# Patient Record
Sex: FEMALE | Race: WHITE | NOT HISPANIC OR LATINO | Employment: FULL TIME | URBAN - METROPOLITAN AREA
[De-identification: names, ages, dates, MRNs, and addresses within clinical notes are randomized per-mention and may not be internally consistent; named-entity substitution may affect disease eponyms.]

---

## 2019-07-29 ENCOUNTER — TRANSFERRED RECORDS (OUTPATIENT)
Dept: FAMILY MEDICINE | Facility: CLINIC | Age: 24
End: 2019-07-29

## 2019-07-29 LAB
CHLAMYDIA - HIM PATIENT REPORTED: NEGATIVE
HPV ABSTRACT: NORMAL
N GONORRHOEA DNA SPEC QL PROBE+SIG AMP: NEGATIVE
PAP-ABSTRACT: NORMAL
PAP-ABSTRACT: NORMAL
SPECIMEN DESCRIP: NORMAL

## 2022-03-10 NOTE — PROGRESS NOTES
"Female Preventive Health Visit    SUBJECTIVE:    This 27 year old female presents for a routine preventive physical exam.    The patient has the following concerns:   1. Hx of migraines: usually only with visual aura and no headache. Started around 2016. Trigger : stress and sleep depravation. Uses caffeine. No recent change.     Patient's medications, allergies, past medical, surgical and family histories were reviewed and updated as appropriate.    OB/Gyn History:    Last Pap Smear:  July 2019. Up to date per patient report. Await outside records. RAF signed today.   Current Contraceptive Method:  Progesterone only pill due to migraine.  Is not currently sexually active. Has lightened periods with POP which she likes. Not using for contraception at present.     Health Maintenance:  Health maintenance alerts were reviewed and updated as appropriate.    Healthy Habits:    Do you get at least three servings of calcium containing foods daily (dairy, green leafy vegetables, etc.)? yes    Amount of exercise or daily activities, outside of work: 3 day(s) per week    Problems taking medications regularly No    Medication side effects: No    Have you had an eye exam in the past two years? yes    Do you see a dentist twice per year? no, last time 2019, planning to go this year!    Do you have sleep apnea, excessive snoring or daytime drowsiness?no      OBJECTIVE:  Vitals:    03/11/22 0807   BP: 111/64   Pulse: 85   SpO2: 100%   Weight: 54.3 kg (119 lb 9.6 oz)   Height: 1.562 m (5' 1.5\")    Body mass index is 22.23 kg/m .  General: no acute distress, cooperative with exam.  HEENT:  PERRLA. Bilateral TM's, external canals, oropharynx normal.    Neck:  Supple, no lymphadenopathy or thyromegaly   Lungs: clear to auscultation bilaterally, normal respiratory effort.  Heart:  RRR without murmurs, rubs or gallops.  Normal S1 and S2.  Abdomen: normal bowel sounds, nontender, no palpable organomegaly.  Skin:  No lesions.  No " rashes.  Extremities: warm, perfused, no swelling or edema.  Neuro:  CN II-XII intact, motor & sensory function all intact.    Psych: mental status normal, mood and affect appropriate.    ASSESSMENT / PLAN:  This 27 year old female presents for a routine preventive physical exam. Preventive health topics discussed including adequate exercise and healthy diet. Return to clinic in one year for preventative exam or sooner with any other concerns.  Other issues discussed as noted below.      Routine general medical examination at a health care facility    Screening cholesterol level  -     Lipid Panel ([facility])  -     VENOUS COLLECTION    Screening for diabetes mellitus  -     Hemoglobin A1C ([facility])  -     VENOUS COLLECTION    Encounter for screening for HIV  -     HIV 1/0/2 Rflx ([facility])  -     VENOUS COLLECTION    Need for hepatitis C screening test  -     HCV Antibody ([facility])  -     VENOUS COLLECTION    Migraine with aura and without status migrainosus, not intractable  Stable at present. Encouraged to return to clinic if frequency / severity etc changes.     Needs flu shot  -     CA FLU VAC PRESRV FREE QUAD SPLIT VIR IM  MONTHS IM  -     VACCINE ADMINISTRATION, INITIAL    Encounter for surveillance of contraceptive pills  Taking POP for lightening of menses only. Not using as contraception. Discussed switching to more efficacious form of progesterone only contraception prior to becoming sexually active. LARCS discussed in more detail.

## 2022-03-11 ENCOUNTER — OFFICE VISIT (OUTPATIENT)
Dept: FAMILY MEDICINE | Facility: CLINIC | Age: 27
End: 2022-03-11

## 2022-03-11 ENCOUNTER — TRANSFERRED RECORDS (OUTPATIENT)
Dept: FAMILY MEDICINE | Facility: CLINIC | Age: 27
End: 2022-03-11

## 2022-03-11 VITALS
DIASTOLIC BLOOD PRESSURE: 64 MMHG | HEART RATE: 85 BPM | WEIGHT: 119.6 LBS | SYSTOLIC BLOOD PRESSURE: 111 MMHG | HEIGHT: 62 IN | BODY MASS INDEX: 22.01 KG/M2 | OXYGEN SATURATION: 100 %

## 2022-03-11 DIAGNOSIS — Z30.41 ENCOUNTER FOR SURVEILLANCE OF CONTRACEPTIVE PILLS: ICD-10-CM

## 2022-03-11 DIAGNOSIS — Z11.59 NEED FOR HEPATITIS C SCREENING TEST: ICD-10-CM

## 2022-03-11 DIAGNOSIS — Z00.00 ROUTINE GENERAL MEDICAL EXAMINATION AT A HEALTH CARE FACILITY: Primary | ICD-10-CM

## 2022-03-11 DIAGNOSIS — Z23 NEEDS FLU SHOT: ICD-10-CM

## 2022-03-11 DIAGNOSIS — Z11.4 ENCOUNTER FOR SCREENING FOR HIV: ICD-10-CM

## 2022-03-11 DIAGNOSIS — Z13.1 SCREENING FOR DIABETES MELLITUS: ICD-10-CM

## 2022-03-11 DIAGNOSIS — Z13.220 SCREENING CHOLESTEROL LEVEL: ICD-10-CM

## 2022-03-11 DIAGNOSIS — G43.109 MIGRAINE WITH AURA AND WITHOUT STATUS MIGRAINOSUS, NOT INTRACTABLE: ICD-10-CM

## 2022-03-11 PROCEDURE — 90471 IMMUNIZATION ADMIN: CPT | Mod: 59 | Performed by: FAMILY MEDICINE

## 2022-03-11 PROCEDURE — 99385 PREV VISIT NEW AGE 18-39: CPT | Mod: 25 | Performed by: FAMILY MEDICINE

## 2022-03-11 PROCEDURE — 36415 COLL VENOUS BLD VENIPUNCTURE: CPT | Performed by: FAMILY MEDICINE

## 2022-03-11 PROCEDURE — 90686 IIV4 VACC NO PRSV 0.5 ML IM: CPT | Performed by: FAMILY MEDICINE

## 2022-03-11 RX ORDER — ACETAMINOPHEN AND CODEINE PHOSPHATE 120; 12 MG/5ML; MG/5ML
0.35 SOLUTION ORAL DAILY
COMMUNITY
End: 2022-06-24

## 2022-03-11 NOTE — LETTER
[facility] [address] [place]  [address]  Phone: [phone]    March 15, 2022      Parisa Chang  [address]  [facility] [address]              Dear Parisa,    It was suze to see you at your recent appointment.     Here are your lab results.     Everything is within expected ranges. All good news here.     Please let us know if you have any questions.     Sincerely,     Dr. Josselin Rivera /dmo                                        Parisa Chang  Results for orders placed or performed in visit on 03/11/22   Lipid Panel ([facility])     Status: None   Result Value Ref Range    Cholesterol 153 100 - 199 mg/dL    Triglycerides 36 0 - 149 mg/dL    HDL Cholesterol 59 >39 mg/dL    VLDL Cholesterol Trevor 9 5 - 40 mg/dL    LDL Cholesterol Calculated 85 0 - 99 mg/dL    LDL/HDL Ratio 1.4 0.0 - 3.2 ratio    Narrative    Performed at:  01 - [facility] [address], [place], [place]  [id number]  : David Romero MD, Phone:  [phone]   HIV 1/0/2 Rflx ([facility])     Status: None   Result Value Ref Range    HIV Screen 4th Gen with Rflx Non Reactive Non Reactive    Narrative    Performed at:  01 - [facility] [address], [place], [place]  [id number]  : David Romero MD, Phone:  [phone]   Hemoglobin A1C ([facility])     Status: None   Result Value Ref Range    Hemoglobin A1C POCT 5.1 4.8 - 5.6 %    Narrative    Performed at:  01 - [facility] [address], [place], [place]  [id number]  : David Romero MD, Phone:  [phone]   HCV Antibody ([facility])     Status: None   Result Value Ref Range    Hep C Virus Ab <0.1 0.0 - 0.9 s/co ratio    Narrative    Performed at:  01 - [facility] [address], [place], [place]  [id number]  : David Romero MD, Phone:  [phone]

## 2022-03-12 LAB — HBA1C MFR BLD: 5.1 % (ref 4.8–5.6)

## 2022-03-15 LAB
CHOLEST SERPL-MCNC: 153 MG/DL (ref 100–199)
HCV AB SERPL QL IA: <0.1 S/CO RATIO (ref 0–0.9)
HDLC SERPL-MCNC: 59 MG/DL
HIV SCREEN 4TH GEN WITH RFLX: NON REACTIVE
LDL/HDL RATIO: 1.4 RATIO (ref 0–3.2)
LDLC SERPL CALC-MCNC: 85 MG/DL (ref 0–99)
TRIGL SERPL-MCNC: 36 MG/DL (ref 0–149)
VLDLC SERPL CALC-MCNC: 9 MG/DL (ref 5–40)

## 2022-06-24 DIAGNOSIS — Z30.41 ENCOUNTER FOR SURVEILLANCE OF CONTRACEPTIVE PILLS: Primary | ICD-10-CM

## 2022-06-24 RX ORDER — ACETAMINOPHEN AND CODEINE PHOSPHATE 120; 12 MG/5ML; MG/5ML
0.35 SOLUTION ORAL DAILY
Qty: 84 TABLET | Refills: 1 | Status: SHIPPED | OUTPATIENT
Start: 2022-06-24 | End: 2022-12-07

## 2022-12-07 DIAGNOSIS — Z30.41 ENCOUNTER FOR SURVEILLANCE OF CONTRACEPTIVE PILLS: ICD-10-CM

## 2022-12-07 RX ORDER — ACETAMINOPHEN AND CODEINE PHOSPHATE 120; 12 MG/5ML; MG/5ML
0.35 SOLUTION ORAL DAILY
Qty: 84 TABLET | Refills: 1 | Status: SHIPPED | OUTPATIENT
Start: 2022-12-07 | End: 2023-05-25

## 2023-05-25 DIAGNOSIS — Z30.41 ENCOUNTER FOR SURVEILLANCE OF CONTRACEPTIVE PILLS: ICD-10-CM

## 2023-05-25 RX ORDER — ACETAMINOPHEN AND CODEINE PHOSPHATE 120; 12 MG/5ML; MG/5ML
0.35 SOLUTION ORAL DAILY
Qty: 84 TABLET | Refills: 0 | Status: SHIPPED | OUTPATIENT
Start: 2023-05-25 | End: 2023-06-14

## 2023-06-13 NOTE — PROGRESS NOTES
"Female Preventive Health Visit    SUBJECTIVE:    This 28 year old female presents for a routine preventive physical exam.    The patient has the following concerns:     1. None     Patient's medications, allergies, past medical, surgical and family histories were reviewed and updated as appropriate.    OB/Gyn History:    Last Pap Smear: due    Health Maintenance:  Health maintenance alerts were reviewed and updated as appropriate.      Healthy Habits:    Do you get at least three servings of calcium containing foods daily (dairy, green leafy vegetables, etc.)? yes    Amount of exercise or daily activities, outside of work: 4 day(s) per week    Problems taking medications regularly No    Medication side effects: No    Have you had an eye exam in the past two years? yes    Do you see a dentist twice per year? No - once per year    Do you have sleep apnea, excessive snoring or daytime drowsiness?no          OBJECTIVE:  Vitals:    06/14/23 0912   BP: 129/66   Pulse: 66   SpO2: 100%   Weight: 59.1 kg (130 lb 6.4 oz)   Height: 1.556 m (5' 1.25\")    Body mass index is 24.44 kg/m .  General: no acute distress, cooperative with exam.  HEENT:  PERRLA. Bilateral TM's, external canals, oropharynx normal.    Neck:  Supple, no lymphadenopathy or thyromegaly   Lungs: clear to auscultation bilaterally, normal respiratory effort.  Heart:  RRR without murmurs, rubs or gallops.  Normal S1 and S2.  Abdomen: normal bowel sounds, nontender, no palpable organomegaly.  Genitourinary: normal external genitalia, vulva, vagina.   Skin:  No lesions.  No rashes.  Extremities: warm, perfused, no swelling or edema.  Neuro:  CN II-XII intact, motor & sensory function all intact.    Psych: mental status normal, mood and affect appropriate.    ASSESSMENT / PLAN:  This 28 year old female presents for a routine preventive physical exam. Preventive health topics discussed including adequate exercise and healthy diet. Return to clinic in one year for " preventative exam or sooner with any other concerns.  Other issues discussed as noted below.      Routine general medical examination at a health care facility    Encounter for surveillance of contraceptive pills  -     norethindrone (NORLYDA) 0.35 MG tablet; Take 1 tablet (0.35 mg) by mouth daily    Need for Tdap vaccination  -     TDAP 10-64Y (ADACEL,BOOSTRIX)  -     VACCINE ADMINISTRATION, INITIAL    Cervical cancer screening  -     Pap IG HPV rfx HPV 16 and 18,45 ([facility])    Family history of diabetes mellitus  -     Hemoglobin A1C ([facility])  -     VENOUS COLLECTION    Screening for cardiovascular condition  -     Lipid Profile ([facility])  -     VENOUS COLLECTION

## 2023-06-14 ENCOUNTER — OFFICE VISIT (OUTPATIENT)
Dept: FAMILY MEDICINE | Facility: CLINIC | Age: 28
End: 2023-06-14

## 2023-06-14 VITALS
WEIGHT: 130.4 LBS | BODY MASS INDEX: 24.62 KG/M2 | DIASTOLIC BLOOD PRESSURE: 66 MMHG | OXYGEN SATURATION: 100 % | HEART RATE: 66 BPM | SYSTOLIC BLOOD PRESSURE: 129 MMHG | HEIGHT: 61 IN

## 2023-06-14 DIAGNOSIS — Z12.4 CERVICAL CANCER SCREENING: ICD-10-CM

## 2023-06-14 DIAGNOSIS — Z13.6 SCREENING FOR CARDIOVASCULAR CONDITION: ICD-10-CM

## 2023-06-14 DIAGNOSIS — Z00.00 ROUTINE GENERAL MEDICAL EXAMINATION AT A HEALTH CARE FACILITY: Primary | ICD-10-CM

## 2023-06-14 DIAGNOSIS — Z23 NEED FOR TDAP VACCINATION: ICD-10-CM

## 2023-06-14 DIAGNOSIS — Z83.3 FAMILY HISTORY OF DIABETES MELLITUS: ICD-10-CM

## 2023-06-14 DIAGNOSIS — Z30.41 ENCOUNTER FOR SURVEILLANCE OF CONTRACEPTIVE PILLS: ICD-10-CM

## 2023-06-14 PROCEDURE — 90715 TDAP VACCINE 7 YRS/> IM: CPT | Performed by: FAMILY MEDICINE

## 2023-06-14 PROCEDURE — 36415 COLL VENOUS BLD VENIPUNCTURE: CPT | Performed by: FAMILY MEDICINE

## 2023-06-14 PROCEDURE — 90471 IMMUNIZATION ADMIN: CPT | Mod: 59 | Performed by: FAMILY MEDICINE

## 2023-06-14 PROCEDURE — 99395 PREV VISIT EST AGE 18-39: CPT | Mod: 25 | Performed by: FAMILY MEDICINE

## 2023-06-14 RX ORDER — ACETAMINOPHEN AND CODEINE PHOSPHATE 120; 12 MG/5ML; MG/5ML
0.35 SOLUTION ORAL DAILY
Qty: 84 TABLET | Refills: 3 | Status: SHIPPED | OUTPATIENT
Start: 2023-06-14 | End: 2023-08-17

## 2023-06-14 NOTE — LETTER
June 19, 2023      Parisa Chang  [address]  [facility] [address]        Dear Parisa,     It was suze to see you at your recent appointment.     You PAP smear is negative for abnormal cells and HPV. Good news.     Your next PAP smear will be in 3 years.     Please let us know if you have any questions.     Sincerely,     Dr. Josselin Rivera     Resulted Orders   Pap IG HPV rfx HPV 16 and 18,45 ([facility])   Result Value Ref Range    DIAGNOSIS: Comment       Comment:      NEGATIVE FOR INTRAEPITHELIAL LESION OR MALIGNANCY.  THIS SPECIMEN WAS RESCREENED AS PART OF OUR  PROGRAM.      Specimen adequacy: Comment       Comment:      Satisfactory for evaluation. Endocervical and/or squamous metaplastic  cells (endocervical component) are present.      Clinician Provided ICD10 Comment       Comment:      Z12.4    Performed by: Comment       Comment:      Mayelin Engel, Cytotechnologist (ASCP)    QC reviewed by: Comment       Comment:      Fazal Hyman, Cytotechnologist (ASCP)    . .     Note: Comment       Comment:      The Pap smear is a screening test designed to aid in the detection of  premalignant and malignant conditions of the uterine cervix.  It is not a  diagnostic procedure and should not be used as the sole means of detecting  cervical cancer.  Both false-positive and false-negative reports do occur.      Test Methodology: Comment       Comment:      This liquid based ThinPrep(R) pap test was screened with the  use of an image guided system.      HPV Aptima Negative Negative      Comment:      This nucleic acid amplification test detects fourteen high-risk  HPV types (16,18,31,33,35,39,45,51,52,56,58,59,66,68) without  differentiation.      HPV GENOTYPE REFLEX Comment       Comment:      Criteria not met, HPV Genotype not performed.    Narrative    Performed at:  01 - [facility]  [address], [place], [place]  [id number]  : Janis Pandya  MD, Phone:  [phone]  Performed at:  02 - [facility] [address]  [id number]  : Jun Avendano MD, Phone:  [phone]  Performed at:  03 - [facility] [address]  [id number]  : Jnu Avendano MD, Phone:  [phone]    Specimen Comment: [id number]  Specimen Comment: LMP / Prev Treat...[id number]  Specimen Comment: No. of containers..01 ThinPrep Vial   Hemoglobin A1C ([facility])   Result Value Ref Range    Hemoglobin A1C 5.1 4.8 - 5.6 %      Comment:               Prediabetes: 5.7 - 6.4           Diabetes: >6.4           Glycemic control for adults with diabetes: <7.0      Narrative    Performed at:  01 - [facility] [address], [place], [place]  [id number]  : David Romero MD, Phone:  [phone]   Lipid Panel ([facility])   Result Value Ref Range    Cholesterol 156 100 - 199 mg/dL    Triglycerides 57 0 - 149 mg/dL    HDL Cholesterol 48 >39 mg/dL    VLDL Cholesterol Trevor 12 5 - 40 mg/dL    LDL Cholesterol Calculated 96 0 - 99 mg/dL    LDL/HDL Ratio 2.0 0.0 - 3.2 ratio      Comment:                                          LDL/HDL Ratio                                              Men  Women                                1/2 Avg.Risk  1.0    1.5                                    Avg.Risk  3.6    3.2                                 2X Avg.Risk  6.2    5.0                                 3X Avg.Risk  8.0    6.1      Narrative    Performed at:  01 - [facility] [address], [place], [place]  [id number]  : David Romero MD, Phone:  [phone]

## 2023-06-14 NOTE — LETTER
Pearl 15, 2023      Parisa Chang  [address]  [address]  [facility] [address]      Dear Parisa,     It was suze to see you at your recent appointment.     Here are your lab results.     Your cholesterol values look wonderful. Everything is within normal limits.     Your A1c (or the measure of your glucose control over the last 3 months) is in the normal range. You do not have diabetes or prediabetes.     Please let us know if you have any questions.     Sincerely,     Dr. Josselin Rivera       Resulted Orders   Hemoglobin A1C ([facility])   Result Value Ref Range    Hemoglobin A1C 5.1 4.8 - 5.6 %      Comment:               Prediabetes: 5.7 - 6.4           Diabetes: >6.4           Glycemic control for adults with diabetes: <7.0      Narrative    Performed at:  01 - [facility]  [address] [place], [place]  [id number]  : David Romero MD, Phone:  [phone]   Lipid Panel ([facility])   Result Value Ref Range    Cholesterol 156 100 - 199 mg/dL    Triglycerides 57 0 - 149 mg/dL    HDL Cholesterol 48 >39 mg/dL    VLDL Cholesterol Trevor 12 5 - 40 mg/dL    LDL Cholesterol Calculated 96 0 - 99 mg/dL    LDL/HDL Ratio 2.0 0.0 - 3.2 ratio      Comment:                                          LDL/HDL Ratio                                              Men  Women                                1/2 Avg.Risk  1.0    1.5                                    Avg.Risk  3.6    3.2                                 2X Avg.Risk  6.2    5.0                                 3X Avg.Risk  8.0    6.1      Narrative    Performed at:  01 - [facility]  [address] [place], [place]  [id number]  : David Romero MD, Phone:  [phone]

## 2023-06-15 LAB
CHOLEST SERPL-MCNC: 156 MG/DL (ref 100–199)
HBA1C MFR BLD: 5.1 % (ref 4.8–5.6)
HDLC SERPL-MCNC: 48 MG/DL
LDL/HDL RATIO: 2 RATIO (ref 0–3.2)
LDLC SERPL CALC-MCNC: 96 MG/DL (ref 0–99)
TRIGL SERPL-MCNC: 57 MG/DL (ref 0–149)
VLDLC SERPL CALC-MCNC: 12 MG/DL (ref 5–40)

## 2023-06-16 LAB
.: NORMAL
CLINICIAN PROVIDED ICD10: NORMAL
DIAGNOSIS:: NORMAL
HPV APTIMA: NEGATIVE
HPV GENOTYPE REFLEX: NORMAL
Lab: NORMAL
Lab: NORMAL
PERFORMED BY:: NORMAL
SPECIMEN ADEQUACY:: NORMAL
TEST METHODOLOGY:: NORMAL

## 2023-08-17 DIAGNOSIS — Z30.41 ENCOUNTER FOR SURVEILLANCE OF CONTRACEPTIVE PILLS: ICD-10-CM

## 2023-08-17 RX ORDER — ACETAMINOPHEN AND CODEINE PHOSPHATE 120; 12 MG/5ML; MG/5ML
0.35 SOLUTION ORAL DAILY
Qty: 84 TABLET | Refills: 3 | Status: SHIPPED | OUTPATIENT
Start: 2023-08-17 | End: 2024-07-17

## 2023-08-17 NOTE — CONFIDENTIAL NOTE
MED: NORETHINDRONE  LOV: (RELATED) 6/14/23 - CP[facility]    UPCOMING APPT: NONE    *WANTING RX SENT TO NEW PHARMACY

## 2024-07-17 DIAGNOSIS — Z30.41 ENCOUNTER FOR SURVEILLANCE OF CONTRACEPTIVE PILLS: ICD-10-CM

## 2024-07-17 NOTE — TELEPHONE ENCOUNTER
Med: Norethindrone    LOV (related): 6/14/23      Due for F/U around: N/A been over 1yr since last visit     Next Appt: None

## 2024-07-18 RX ORDER — ACETAMINOPHEN AND CODEINE PHOSPHATE 120; 12 MG/5ML; MG/5ML
0.35 SOLUTION ORAL DAILY
Qty: 84 TABLET | Refills: 0 | Status: SHIPPED | OUTPATIENT
Start: 2024-07-18 | End: 2024-09-10

## 2024-09-10 NOTE — PATIENT INSTRUCTIONS
Patient Education   Preventive Care Advice   This is general advice given by our system to help you stay healthy. However, your care team may have specific advice just for you. Please talk to your care team about your preventive care needs.  Nutrition  Eat 5 or more servings of fruits and vegetables each day.  Try wheat bread, brown rice and whole grain pasta (instead of white bread, rice, and pasta).  Get enough calcium and vitamin D. Check the label on foods and aim for 100% of the RDA (recommended daily allowance).  Lifestyle  Exercise at least 150 minutes each week  (30 minutes a day, 5 days a week).  Do muscle strengthening activities 2 days a week. These help control your weight and prevent disease.  No smoking.  Wear sunscreen to prevent skin cancer.  Have a dental exam and cleaning every 6 months.  Yearly exams  See your health care team every year to talk about:  Any changes in your health.  Any medicines your care team has prescribed.  Preventive care, family planning, and ways to prevent chronic diseases.  Shots (vaccines)   HPV shots (up to age 26), if you've never had them before.  Hepatitis B shots (up to age 59), if you've never had them before.  COVID-19 shot: Get this shot when it's due.  Flu shot: Get a flu shot every year.  Tetanus shot: Get a tetanus shot every 10 years.  Pneumococcal, hepatitis A, and RSV shots: Ask your care team if you need these based on your risk.  Shingles shot (for age 50 and up)  General health tests  Diabetes screening:  Starting at age 35, Get screened for diabetes at least every 3 years.  If you are younger than age 35, ask your care team if you should be screened for diabetes.  Cholesterol test: At age 39, start having a cholesterol test every 5 years, or more often if advised.  Bone density scan (DEXA): At age 50, ask your care team if you should have this scan for osteoporosis (brittle bones).  Hepatitis C: Get tested at least once in your life.  STIs (sexually  transmitted infections)  Before age 24: Ask your care team if you should be screened for STIs.  After age 24: Get screened for STIs if you're at risk. You are at risk for STIs (including HIV) if:  You are sexually active with more than one person.  You don't use condoms every time.  You or a partner was diagnosed with a sexually transmitted infection.  If you are at risk for HIV, ask about PrEP medicine to prevent HIV.  Get tested for HIV at least once in your life, whether you are at risk for HIV or not.  Cancer screening tests  Cervical cancer screening: If you have a cervix, begin getting regular cervical cancer screening tests starting at age 21.  Breast cancer scan (mammogram): If you've ever had breasts, begin having regular mammograms starting at age 40. This is a scan to check for breast cancer.  Colon cancer screening: It is important to start screening for colon cancer at age 45.  Have a colonoscopy test every 10 years (or more often if you're at risk) Or, ask your provider about stool tests like a FIT test every year or Cologuard test every 3 years.  To learn more about your testing options, visit:   .  For help making a decision, visit:   https://bit.ly/gv75892.  Prostate cancer screening test: If you have a prostate, ask your care team if a prostate cancer screening test (PSA) at age 55 is right for you.  Lung cancer screening: If you are a current or former smoker ages 50 to 80, ask your care team if ongoing lung cancer screenings are right for you.  For informational purposes only. Not to replace the advice of your health care provider. Copyright   2023 [email or web address]. All rights reserved. Clinically reviewed by the [facility]. [employer] 851435 - REV 01/24.

## 2024-09-11 ENCOUNTER — OFFICE VISIT (OUTPATIENT)
Dept: FAMILY MEDICINE | Facility: CLINIC | Age: 29
End: 2024-09-11

## 2024-09-11 VITALS
HEART RATE: 60 BPM | WEIGHT: 136.4 LBS | OXYGEN SATURATION: 100 % | DIASTOLIC BLOOD PRESSURE: 72 MMHG | BODY MASS INDEX: 25.1 KG/M2 | HEIGHT: 62 IN | SYSTOLIC BLOOD PRESSURE: 117 MMHG

## 2024-09-11 DIAGNOSIS — Z23 FLU VACCINE NEED: ICD-10-CM

## 2024-09-11 DIAGNOSIS — Z30.41 ENCOUNTER FOR SURVEILLANCE OF CONTRACEPTIVE PILLS: ICD-10-CM

## 2024-09-11 DIAGNOSIS — J45.990 EXERCISE-INDUCED ASTHMA: ICD-10-CM

## 2024-09-11 DIAGNOSIS — L70.0 ACNE VULGARIS: ICD-10-CM

## 2024-09-11 DIAGNOSIS — Z00.00 ROUTINE GENERAL MEDICAL EXAMINATION AT A HEALTH CARE FACILITY: Primary | ICD-10-CM

## 2024-09-11 PROCEDURE — 99395 PREV VISIT EST AGE 18-39: CPT | Performed by: FAMILY MEDICINE

## 2024-09-11 PROCEDURE — 99214 OFFICE O/P EST MOD 30 MIN: CPT | Mod: 25 | Performed by: FAMILY MEDICINE

## 2024-09-11 PROCEDURE — 90661 CCIIV3 VAC ABX FR 0.5 ML IM: CPT | Performed by: FAMILY MEDICINE

## 2024-09-11 PROCEDURE — 90471 IMMUNIZATION ADMIN: CPT | Performed by: FAMILY MEDICINE

## 2024-09-11 RX ORDER — ACETAMINOPHEN AND CODEINE PHOSPHATE 120; 12 MG/5ML; MG/5ML
0.35 SOLUTION ORAL DAILY
Qty: 84 TABLET | Refills: 3 | Status: SHIPPED | OUTPATIENT
Start: 2024-09-11

## 2024-09-11 RX ORDER — ALBUTEROL SULFATE 90 UG/1
2 AEROSOL, METERED RESPIRATORY (INHALATION) EVERY 6 HOURS PRN
Qty: 18 G | Refills: 11 | Status: SHIPPED | OUTPATIENT
Start: 2024-09-11

## 2024-09-11 RX ORDER — SPIRONOLACTONE 25 MG/1
25 TABLET ORAL DAILY
Qty: 90 TABLET | Refills: 3 | Status: SHIPPED | OUTPATIENT
Start: 2024-09-11

## 2024-09-11 NOTE — PROGRESS NOTES
Female Preventive Health Visit    SUBJECTIVE:    This 29 year old female presents for a routine preventive physical exam.    The patient has the following concerns:     -Acne. Onset around 1 year ago. Chin and cheek area. Differin, exfoliation, facial  routines with no effect.   -wheezing and tight chestedness during and after running / jogging. Does not recall similar symptoms as a child. PMHx of eczema as a child. Has completed allergy testing as a child. Seasonal allergies.  Allergy to cut grass.       Patient's medications, allergies, past medical, surgical and family histories were reviewed and updated as appropriate.    OB/Gyn History:    Last Pap Smear:  UTD  Current Contraceptive Method:  progesterone only pill (ocular migranes). Happy with this form of contraception.       Health Maintenance:  Health maintenance alerts were reviewed and updated as appropriate.          9/11/2024   General Health   How would you rate your overall physical health? Good   Feel stress (tense, anxious, or unable to sleep) Not at all         9/11/2024   Nutrition   Three or more servings of calcium each day? Yes   Diet: Regular (no restrictions)   How many servings of fruit and vegetables per day? (!) 2-3   How many sweetened beverages each day? 0-1          9/11/2024   Exercise   Days per week of moderate/strenous exercise 3 days   Average minutes spent exercising at this level 40 min            9/11/2024   Social Factors   Frequency of gathering with friends or relatives Twice a week   Worry food won't last until get money to buy more No   Food not last or not have enough money for food? No   Do you have housing? (Housing is defined as stable permanent housing and does not include staying ouside in a car, in a tent, in an abandoned building, in an overnight shelter, or couch-surfing.) Yes   Are you worried about losing your housing? No   Lack of transportation? No   Unable to get utilities (heat,electricity)? No          "9/11/2024   Dental   Dentist two times every year? Yes          9/11/2024   TB Screening   Were you born outside of the [place]? No      Today's PHQ-2 Score:       9/11/2024     7:57 AM   PHQ-2 ( 1999 [employer])   Q1: Little interest or pleasure in doing things 0   Q2: Feeling down, depressed or hopeless 0   PHQ-2 Score 0           9/11/2024   Substance Use   Alcohol more than 3/day or more than 7/wk No   Do you use any other substances recreationally? No        Social History     Tobacco Use    Smoking status: Never    Smokeless tobacco: Never   Substance Use Topics    Alcohol use: Yes         9/11/2024   STI Screening   New sexual partner(s) since last STI/HIV test? No           7/29/2019    12:00 AM   PAP / HPV   PAP-ABSTRACT See Scanned Document        See Scanned Document           This result is from an external source.    Multiple values from one day are sorted in reverse-chronological order           9/11/2024   Contraception/Family Planning   Questions about contraception or family planning No          OBJECTIVE:  Vitals:    09/11/24 0757   BP: 117/72   Pulse: 60   SpO2: 100%   Weight: 61.9 kg (136 lb 6.4 oz)   Height: 1.58 m (5' 2.21\")    Body mass index is 24.78 kg/m .  General: no acute distress, cooperative with exam.  HEENT:  PERRLA. Bilateral TM's, external canals, oropharynx normal.    Neck:  Supple, no lymphadenopathy or thyromegaly   Lungs: clear to auscultation bilaterally, normal respiratory effort.  Heart:  RRR without murmurs, rubs or gallops.  Normal S1 and S2.  Abdomen: normal bowel sounds, nontender, no palpable organomegaly.  Skin: scattered closed comedones to chin and cheek areas. No cystic lesions. No scarring.   Extremities: warm, perfused, no swelling or edema.  Neuro:  CN II-XII intact, motor & sensory function all intact.    Psych: mental status normal, mood and affect appropriate.      ASSESSMENT / PLAN:  This 29 year old female presents for a routine preventive physical exam. Preventive " health topics discussed including adequate exercise and healthy diet. Return to clinic in one year for preventative exam or sooner with any other concerns.  Other issues discussed as noted below.      Routine general medical examination at a health care facility    Encounter for surveillance of contraceptive pills  Progesterone only pill (ocular migranes). Happy with this form of contraception.   -     norethindrone (NORLYDA) 0.35 MG tablet; Take 1 tablet (0.35 mg) by mouth daily.    Acne vulgaris  In hormonal distribution. Trial of anti androgen effect of spironolactone. Mechanism of action, common side effects and how to take discussed.   Ok to increase to 50mg daily after 6 weeks if minimal benefit noted (will MyChart message)  -     spironolactone (ALDACTONE) 25 MG tablet; Take 1 tablet (25 mg) by mouth daily.    Exercise-induced asthma  Trial of CHRIS. Mechanism of action, common side effects and how to take discussed.    -     albuterol (PROAIR HFA/PROVENTIL HFA/VENTOLIN HFA) 108 (90 Base) MCG/ACT inhaler; Inhale 2 puffs into the lungs every 6 hours as needed for shortness of breath, wheezing or cough.    Flu vaccine need  INFLUENZA VACCINE, SPLIT VIRUS, TRIVALENT,PF (FLUZONE\FLUARIX)  -     VACCINE ADMINISTRATION, INITIAL

## 2025-09-04 DIAGNOSIS — L70.0 ACNE VULGARIS: ICD-10-CM

## 2025-09-04 RX ORDER — SPIRONOLACTONE 25 MG/1
25 TABLET ORAL DAILY
Qty: 90 TABLET | Refills: 0 | Status: SHIPPED | OUTPATIENT
Start: 2025-09-04